# Patient Record
Sex: FEMALE | Race: WHITE
[De-identification: names, ages, dates, MRNs, and addresses within clinical notes are randomized per-mention and may not be internally consistent; named-entity substitution may affect disease eponyms.]

---

## 2017-04-13 ENCOUNTER — HOSPITAL ENCOUNTER (EMERGENCY)
Dept: HOSPITAL 10 - FTE | Age: 27
Discharge: HOME | End: 2017-04-13
Payer: COMMERCIAL

## 2017-04-13 VITALS
WEIGHT: 144.4 LBS | HEIGHT: 64 IN | BODY MASS INDEX: 24.65 KG/M2 | WEIGHT: 144.4 LBS | HEIGHT: 64 IN | BODY MASS INDEX: 24.65 KG/M2

## 2017-04-13 VITALS
HEART RATE: 94 BPM | RESPIRATION RATE: 18 BRPM | SYSTOLIC BLOOD PRESSURE: 118 MMHG | TEMPERATURE: 98.4 F | DIASTOLIC BLOOD PRESSURE: 78 MMHG

## 2017-04-13 DIAGNOSIS — S39.92XA: ICD-10-CM

## 2017-04-13 DIAGNOSIS — F17.210: ICD-10-CM
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
## 2017-04-13 DIAGNOSIS — S60.221A: ICD-10-CM

## 2017-04-13 DIAGNOSIS — S20.212A: ICD-10-CM

## 2017-04-13 DIAGNOSIS — S00.03XA: Primary | ICD-10-CM

## 2017-04-13 DIAGNOSIS — S50.11XA: ICD-10-CM

## 2017-04-13 PROCEDURE — 73090 X-RAY EXAM OF FOREARM: CPT

## 2017-04-13 PROCEDURE — 71100 X-RAY EXAM RIBS UNI 2 VIEWS: CPT

## 2017-04-13 PROCEDURE — 70450 CT HEAD/BRAIN W/O DYE: CPT

## 2017-04-13 PROCEDURE — 73130 X-RAY EXAM OF HAND: CPT

## 2017-04-13 PROCEDURE — 70486 CT MAXILLOFACIAL W/O DYE: CPT

## 2017-04-13 PROCEDURE — 72131 CT LUMBAR SPINE W/O DYE: CPT

## 2017-04-13 NOTE — RADRPT
PROCEDURE:   CT brain without contrast. 

 

CLINICAL INDICATION:   Injury and pain. 

 

TECHNIQUE:    CT scan of the brain was performed on a multi-detector high-resolution CT scanner.  Co
ntiguous axial images were obtained from the skull base to the vertex without intravenous contrast. 
 Coronal and sagittal reformatted images were also obtained.  Images were reviewed on the PACS works
tation.

 

One or more of the following dose reduction techniques were used:

- Automated exposure control.

- Adjustment of the mA and/or kV according to patient size.

- Use of iterative reconstruction technique.

 

Exam CTD/vol = 45.01 mGy.

Total exam DLP = 720.23 mGy-cm.   

 

COMPARISON:   None. 

 

FINDINGS:

The ventricles and cortical sulci are within normal limits for patient's age. There are no areas of 
abnormal attenuation within the brain parenchyma. There is no mass effect or midline shift. There is
 no intracranial hemorrhage or abnormal extra-axial collection. 

 

The calvarium is intact. There is no evidence of fracture. Visualized paranasal sinuses and mastoid 
air cells are clear.  

 

IMPRESSION:

No acute intracranial abnormality identified.

_____________________________________________ 

.Genaro Hogan MD, MD           Date    Time 

Electronically viewed and signed by .Genaro Hogan MD, MD on 04/13/2017 06:07 

 

D:  04/13/2017 06:07  T:  04/13/2017 06:07

.T/

## 2017-04-13 NOTE — RADRPT
PROCEDURE:   XR Forearm. 

 

CLINICAL INDICATION:   Pain following injury

 

TECHNIQUE:   AP and lateral views of the right forearm were obtained. 

 

COMPARISON:   No prior studies are available for comparison. 

 

FINDINGS:

There is normal mineralization and alignment. No fracture or osseous lesion is identified. The joint
 spaces are well maintained.  No periostitis or osteochondral lesion is identified.  The soft tissue
s are unremarkable. 

 

IMPRESSION:

 

 

Unremarkable right forearm x-rays.

 

RPTAT: HH

_____________________________________________ 

.Stephanie Dumont MD, MD           Date    Time 

Electronically viewed and signed by .Stephanie Dumont MD, MD on 04/13/2017 05:50 

 

D:  04/13/2017 05:50  T:  04/13/2017 05:50

.G/
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
## 2017-04-13 NOTE — RADRPT
PROCEDURE:   XR Left rib series. 

 

CLINICAL INDICATION:  Pain following injury. 

 

TECHNIQUE:   3 views of the left rib cage and an AP view of the chest are available for review 

 

COMPARISON:   None available 

 

FINDINGS:

 

No acute fracture or dislocation is seen.  No radiopaque foreign body is identified. No focal airspa
ce opacity, pleural effusion or pneumothorax is seen.  The cardiothymic silhouette is within normal 
limits for size.

 

IMPRESSION:

 

 Unremarkable left rib cage x-ray series.

 

RPTAT: HH

_____________________________________________ 

.Stephanie Dumont MD, MD           Date    Time 

Electronically viewed and signed by .Stephanie Dumont MD, MD on 04/13/2017 05:52 

 

D:  04/13/2017 05:52  T:  04/13/2017 05:52

.G/

## 2017-04-13 NOTE — RADRPT
PROCEDURE:   CT Lumbar Spine without contrast. 

 

CLINICAL INDICATION:   Back pain. 

 

TECHNIQUE:    CT scan of the lumbar spine [facility]  Contiguous axial images were obtained without intravenous contrast.  Coronal and sagittal refor
matted images were also obtained.  Images were reviewed on the PACS workstation.

 

One or more of the following dose reduction techniques were used:

- Automated exposure control.

- Adjustment of the mA and/or kV according to patient size.

- Use of iterative reconstruction technique.

 

Exam CTD/vol = 9.75 mGy.

Total exam DLP = 348.62 mGy-cm.  

 

COMPARISON:   None. 

 

FINDINGS:

Lumbar vertebral body heights and alignment are within normal limits.  There is no acute fracture or
 subluxation. 

 

At T12-L1, the disk height is within normal limits.  There is no central canal or neural foraminal s
tenosis. 



At L1-L2, the disk height is within normal limits.  There is no central canal or neural foraminal st
enosis. 

 

At L2-L3, the disk height is within normal limits.  There is no central canal or neural foraminal st
enosis. 

 

At L3-L4, the disk height is within normal limits.  There is no central canal or neural foraminal st
enosis.

 

At L4-L5, the disk height is within normal limits.  There is no central canal or neural foraminal st
enosis. 

 

At L5-S1, the disk height is within normal limits.  There is no central canal or neural foraminal st
enosis. 

 

There is no paraspinal mass or collection. 

 

IMPRESSION:

Unremarkable CT scan of the lumbar spine.

_____________________________________________ 

.Genaro Hogan MD, MD           Date    Time 

Electronically viewed and signed by .Genaro Hogan MD, MD on 04/13/2017 06:16 

 

D:  04/13/2017 06:16  T:  04/13/2017 06:16

.T/

## 2017-04-13 NOTE — RADRPT
PROCEDURE:   CT facial bones without contrast. 

 

CLINICAL INDICATION:   Injury and pain. 

 

TECHNIQUE:    CT scan of the facial bones was [facility]  Contiguous axial images were obtained without intravenous contrast.  Coronal and sagittal refor
matted images were also obtained.  Images were reviewed on the PACS workstation.

 

One or more of the following dose reduction techniques were used:

- Automated exposure control.

- Adjustment of the mA and/or kV according to patient size.

- Use of iterative reconstruction technique.

 

Exam CTD/vol = 29.45 mGy.

Total exam DLP = 532.77 mGy-cm.   

 

COMPARISON:   None. 

 

FINDINGS:

There is no acute fracture.  The nasal bones are intact.  Bilateral orbital rims, zygoma and zygomat
ic arches are intact.  The pterygoid plates are intact. The mandible and maxilla are within normal l
imits.  Bilateral temporomandibular joints are within normal limits.  Paranasal sinuses are clear.  
There are no air-fluid levels.

 

Bilateral orbital globes are symmetric and within normal limits.  The extraocular muscles are symmet
bindu and of normal caliber.  Preseptal spaces are within normal limits.  Retrobulbar fat are clear.  
Bilateral optic nerves and superior ophthalmic veins are within normal limits.  

 

IMPRESSION:

No evidence of fracture.

_____________________________________________ 

.Genaro Hogan MD, MD           Date    Time 

Electronically viewed and signed by .Genaro Hogan MD, MD on 04/13/2017 06:11 

 

D:  04/13/2017 06:11  T:  04/13/2017 06:11

.T/

## 2017-04-13 NOTE — RADRPT
PROCEDURE:   XR Hand. 

 

CLINICAL INDICATION:  Right hand pain following injury.

 

TECHNIQUE:   Three views of the right hand were obtained. 

 

COMPARISON:   No prior studies are available for comparison. 

 

FINDINGS:

 

The osseous structures demonstrate normal alignment and mineralization.  No acute fracture or disloc
ation is seen.  The joint spaces are well preserved.  No significant soft tissue abnormalities are a
ppreciated.

 

IMPRESSION:

 

1.  Unremarkable right hand x-ray series. 

2.  No acute fracture or dislocation is seen. 

 

 

RPTAT: HH

_____________________________________________ 

.Stephanie Dumont MD, MD           Date    Time 

Electronically viewed and signed by .Stephanie Dumont MD, MD on 04/13/2017 05:49 

 

D:  04/13/2017 05:49  T:  04/13/2017 05:49

.G/

## 2019-11-09 ENCOUNTER — HOSPITAL ENCOUNTER (EMERGENCY)
Dept: HOSPITAL 54 - ER | Age: 29
LOS: 1 days | Discharge: HOME | End: 2019-11-10
Payer: COMMERCIAL

## 2019-11-09 VITALS — BODY MASS INDEX: 19.99 KG/M2 | HEIGHT: 69 IN | WEIGHT: 135 LBS

## 2019-11-09 DIAGNOSIS — J45.909: Primary | ICD-10-CM

## 2019-11-09 DIAGNOSIS — Z88.6: ICD-10-CM
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
## 2019-11-10 VITALS — DIASTOLIC BLOOD PRESSURE: 71 MMHG | SYSTOLIC BLOOD PRESSURE: 120 MMHG

## 2019-11-24 ENCOUNTER — HOSPITAL ENCOUNTER (EMERGENCY)
Dept: HOSPITAL 54 - ER | Age: 29
Discharge: HOME | End: 2019-11-24
Payer: MEDICAID

## 2019-11-24 VITALS — SYSTOLIC BLOOD PRESSURE: 119 MMHG | DIASTOLIC BLOOD PRESSURE: 81 MMHG

## 2019-11-24 VITALS — HEIGHT: 69 IN | BODY MASS INDEX: 19.26 KG/M2 | WEIGHT: 130 LBS

## 2019-11-24 DIAGNOSIS — Z60.2: ICD-10-CM

## 2019-11-24 DIAGNOSIS — R51: Primary | ICD-10-CM

## 2019-11-24 DIAGNOSIS — Z88.6: ICD-10-CM

## 2019-11-24 DIAGNOSIS — J45.909: ICD-10-CM

## 2019-11-24 PROCEDURE — 99283 EMERGENCY DEPT VISIT LOW MDM: CPT

## 2019-11-24 SDOH — SOCIAL STABILITY - SOCIAL INSECURITY: PROBLEMS RELATED TO LIVING ALONE: Z60.2

## 2019-11-24 NOTE — NUR
JOCELYN C/C L SIDED HEADACHE X1HR, -N/V, "I SEE GREEN COLOR SPOTS", MINOR 
DIZZINESS, TO ER BED 3, MD AT BEDISED FOR EVAL.